# Patient Record
Sex: MALE | Race: BLACK OR AFRICAN AMERICAN | Employment: FULL TIME | URBAN - METROPOLITAN AREA
[De-identification: names, ages, dates, MRNs, and addresses within clinical notes are randomized per-mention and may not be internally consistent; named-entity substitution may affect disease eponyms.]

---

## 2019-06-05 ENCOUNTER — HOSPITAL ENCOUNTER (EMERGENCY)
Age: 34
Discharge: HOME | End: 2019-06-06
Attending: EMERGENCY MEDICINE
Payer: COMMERCIAL

## 2019-06-05 DIAGNOSIS — S86.911A KNEE STRAIN, RIGHT, INITIAL ENCOUNTER: Primary | ICD-10-CM

## 2019-06-05 DIAGNOSIS — R03.0 ELEVATED BLOOD PRESSURE READING: ICD-10-CM

## 2019-06-05 PROCEDURE — 99283 EMERGENCY DEPT VISIT LOW MDM: CPT

## 2019-06-05 NOTE — LETTER
[facility] [place] 
[facility] 
[address] [address] 
[phone] Work/School Note Date: 6/5/2019 To Whom It May concern: 
 
Charla Baugh was seen and treated today in the emergency room by the following provider(s): 
Attending Provider: Thalia Moore DO Physician Assistant: DIPTI Reyna.   
 
Charla Baugh may be excused from work today Sincerely, 
 
 
 
 
DIPTI To

## 2019-06-06 ENCOUNTER — APPOINTMENT (OUTPATIENT)
Dept: GENERAL RADIOLOGY | Age: 34
End: 2019-06-06
Attending: PHYSICIAN ASSISTANT
Payer: COMMERCIAL

## 2019-06-06 VITALS
SYSTOLIC BLOOD PRESSURE: 172 MMHG | BODY MASS INDEX: 37.1 KG/M2 | HEART RATE: 100 BPM | OXYGEN SATURATION: 96 % | TEMPERATURE: 98.5 F | RESPIRATION RATE: 16 BRPM | DIASTOLIC BLOOD PRESSURE: 93 MMHG | HEIGHT: 71 IN | WEIGHT: 265 LBS

## 2019-06-06 PROCEDURE — 73564 X-RAY EXAM KNEE 4 OR MORE: CPT

## 2019-06-06 PROCEDURE — L1830 KO IMMOB CANVAS LONG PRE OTS: HCPCS

## 2019-06-06 RX ORDER — TRAMADOL HYDROCHLORIDE 50 MG/1
50 TABLET ORAL
Qty: 20 TAB | Refills: 0 | Status: SHIPPED | OUTPATIENT
Start: 2019-06-06 | End: 2019-06-09

## 2019-06-06 NOTE — DISCHARGE INSTRUCTIONS
Patient Education        Elevated Blood Pressure: Care Instructions  Your Care Instructions    Blood pressure is a measure of how hard the blood pushes against the walls of your arteries. It's normal for blood pressure to go up and down throughout the day. But if it stays up over time, you have high blood pressure. Two numbers tell you your blood pressure. The first number is the systolic pressure. It shows how hard the blood pushes when your heart is pumping. The second number is the diastolic pressure. It shows how hard the blood pushes between heartbeats, when your heart is relaxed and filling with blood. An ideal blood pressure in adults is less than 120/80 (say \"120 over 80\"). High blood pressure is 140/90 or higher. You have high blood pressure if your top number is 140 or higher or your bottom number is 90 or higher, or both. The main test for high blood pressure is simple, fast, and painless. To diagnose high blood pressure, your doctor will test your blood pressure at different times. After testing your blood pressure, your doctor may ask you to test it again when you are home. If you are diagnosed with high blood pressure, you can work with your doctor to make a long-term plan to manage it. Follow-up care is a key part of your treatment and safety. Be sure to make and go to all appointments, and call your doctor if you are having problems. It's also a good idea to know your test results and keep a list of the medicines you take. How can you care for yourself at home? · Do not smoke. Smoking increases your risk for heart attack and stroke. If you need help quitting, talk to your doctor about stop-smoking programs and medicines. These can increase your chances of quitting for good. · Stay at a healthy weight. · Try to limit how much sodium you eat to less than 2,300 milligrams (mg) a day. Your doctor may ask you to try to eat less than 1,500 mg a day. · Be physically active.  Get at least 30 minutes of exercise on most days of the week. Walking is a good choice. You also may want to do other activities, such as running, swimming, cycling, or playing tennis or team sports. · Avoid or limit alcohol. Talk to your doctor about whether you can drink any alcohol. · Eat plenty of fruits, vegetables, and low-fat dairy products. Eat less saturated and total fats. · Learn how to check your blood pressure at home. When should you call for help? Call your doctor now or seek immediate medical care if:  ? · Your blood pressure is much higher than normal (such as 180/110 or higher). ? · You think high blood pressure is causing symptoms such as:  ¨ Severe headache. ¨ Blurry vision. ? Watch closely for changes in your health, and be sure to contact your doctor if:  ? · You do not get better as expected. Where can you learn more? Go to http://waqas-terrence.info/. Enter Q408 in the search box to learn more about \"Elevated Blood Pressure: Care Instructions. \"  Current as of: September 21, 2016  Content Version: 11.4  © 5214-1795 [employer]. Care instructions adapted under license by [employer] (which disclaims liability or warranty for this information). If you have questions about a medical condition or this instruction, always ask your healthcare professional. [facility] any warranty or liability for your use of this information.

## 2019-06-06 NOTE — ED PROVIDER NOTES
EMERGENCY DEPARTMENT HISTORY AND PHYSICAL EXAM    Date: 6/5/2019  Patient Name: King Palma    History of Presenting Illness     Chief Complaint   Patient presents with    Knee Pain         History Provided By: Patient    King Palma is a 37 y.o. male with PMHX of hypertension who presents to the emergency department C/O right knee pain. Associated sxs include right knee swelling, painful ambulation. Patient reports 2 to 3-week history of right lateral knee pain. Patient states he is due to the area at the time of the knee pain first started he had just moved states there was no particular fall or injury however did a lot of moving furniture boxes may have caused the pain patient really seemed unsure. Patient is attempted Aleve and Motrinand 1 of his wife's muscle relaxers with no relief. Pain seems to be worsened with bearing weight and improves with rest.  Patient endorses a history of high blood pressure however does not take any medications. Patient states he is new to the area would like a referral for primary care doctor. Pt denies fall, particular injury, history of knee problems, redness warmth rash, swelling to the leg, and any other sxs or complaints. PCP: No primary care provider on file. Past History     Past Medical History:  History reviewed. No pertinent past medical history. Past Surgical History:  History reviewed. No pertinent surgical history. Family History:  History reviewed. No pertinent family history. Social History:  Social History     Tobacco Use    Smoking status: Never Smoker    Smokeless tobacco: Never Used   Substance Use Topics    Alcohol use: Never     Frequency: Never    Drug use: Never       Allergies:  No Known Allergies      Review of Systems   Review of Systems   Constitutional: Negative for fever. Musculoskeletal: Positive for arthralgias. Negative for myalgias. Skin: Negative for rash and wound.    Neurological: Negative for weakness and numbness. All other systems reviewed and are negative. Physical Exam     Vitals:    06/06/19 0001   BP: (!) 163/107   Pulse: (!) 106   Resp: 14   Temp: 98.5 °F (36.9 °C)   SpO2: 98%   Weight: 120.2 kg (265 lb)   Height: 5' 11\" (1.803 m)     Physical Exam   Constitutional: He is oriented to person, place, and time. He appears well-developed and well-nourished. No distress. Overweight reclined on stretcher appears comfortable in no acute distress   HENT:   Head: Normocephalic and atraumatic. Eyes: Pupils are equal, round, and reactive to light. Conjunctivae and EOM are normal.   Neck: Normal range of motion. Neck supple. Musculoskeletal: Normal range of motion. He exhibits tenderness. He exhibits no edema or deformity. Right hip: Normal.        Right knee: He exhibits normal range of motion, no swelling, no effusion, no ecchymosis, no deformity, no erythema and normal alignment. Tenderness found. Lateral joint line tenderness noted. Right ankle: Normal. He exhibits normal pulse. Legs:  RLE: Atraumatic appearing without erythema or warmth,+tenderness to right lateral knee, full range of motion, no pedal edema, no calf tenderness, neurovascular intact   Neurological: He is alert and oriented to person, place, and time. Skin: Skin is warm and dry. Psychiatric: He has a normal mood and affect. His behavior is normal.   Nursing note and vitals reviewed. Diagnostic Study Results     Labs -   No results found for this or any previous visit (from the past 12 hour(s)). Radiologic Studies -   XR KNEE RT MIN 4 V    (Results Pending)     CT Results  (Last 48 hours)    None        CXR Results  (Last 48 hours)    None          Medications given in the ED-  Medications - No data to display      Medical Decision Making   I am the first provider for this patient.     I reviewed the vital signs, available nursing notes, past medical history, past surgical history, family history and social history. Vital Signs-Reviewed the patient's vital signs. Pulse Oximetry Analysis - 100% on RA     Records Reviewed: Nursing Notes    Procedures:  Procedures    ED Course:   12:00 AM   Initial assessment performed. The patients presenting problems have been discussed, and they are in agreement with the care plan formulated and outlined with them. I have encouraged them to ask questions as they arise throughout their visit. Discussion: 37 y.o. male planing of to 3-week history of right lateral knee pain. Reports new to the area with a lot of moving of furniture at the onset of pain may have injured at that time. He is neurovascularly intact x-rays with no acute process likely mechanical strain to knee will plan for rice instructions immobilizer crutches pain medication and orthopedic follow-up. Side note patient also found to have elevated blood pressure reading offered referral for primary care doctor and blood pressure checks. Strict return precautions discussed. Diagnosis and Disposition       DISCHARGE NOTE:  Chayito Jerrell Halle's  results have been reviewed with him. He has been counseled regarding his diagnosis, treatment, and plan. He verbally conveys understanding and agreement of the signs, symptoms, diagnosis, treatment and prognosis and additionally agrees to follow up as discussed. He also agrees with the care-plan and conveys that all of his questions have been answered. I have also provided discharge instructions for him that include: educational information regarding their diagnosis and treatment, and list of reasons why they would want to return to the ED prior to their follow-up appointment, should his condition change. He has been provided with education for proper emergency department utilization. CLINICAL IMPRESSION:    1. Knee strain, right, initial encounter    2. Elevated blood pressure reading        PLAN:  1. D/C Home  2.    Current Discharge Medication List START taking these medications    Details   traMADol (ULTRAM) 50 mg tablet Take 1 Tab by mouth every six (6) hours as needed for Pain for up to 3 days. Max Daily Amount: 200 mg. Qty: 20 Tab, Refills: 0    Associated Diagnoses: Knee strain, right, initial encounter           3. Follow-up Information     Follow up With Specialties Details Why Contact Info    your pcp  Schedule an appointment as soon as possible for a visit      [facility] EMERGENCY DEPT Emergency Medicine  As needed, If symptoms worsen [address] [address]  [phone]    Gautam Palmer MD Orthopedic Surgery Schedule an appointment as soon as possible for a visit  [address] [place] [address]  [address]      Lesvia Lancaster MD Internal Medicine  for primary care follow up Carolyn Martinez  [phone]                  Please note that this dictation was completed with [employer], the computer voice recognition software. Quite often unanticipated grammatical, syntax, homophones, and other interpretive errors are inadvertently transcribed by the computer software. Please disregard these errors. Please excuse any errors that have escaped final proofreading.

## 2023-12-28 NOTE — ED TRIAGE NOTES
Bariatric Post-Op Follow Up Appointment: 3 mos     Current Weight: 213 lb  Current BMI: 32.63  Percentage of weight loss achieved: 55    Dietary Intake: lifelong rules   Breakfast- a greek yogurt topped with 1/3 - 1/2 cup protein granola   Lunch- 2 turkey meat sticks with cheese cubes and a couple of slices of cucumber dipped in humus   Dinner- meatloaf with side of a little bit of mashed potatoes and green beans   Volume of food at a time: 3/4 cup   Snacks: no sugar added popsicle   Beverages: water   Alcohol Intake: 1 drink since surgery   Do you drink with your meals? No   Current Exercise: walking everyday for at least 30 minutes, or an exercise video on occ when pt can't walk  Vitamins/minerals: Bariatric Pal 45 mg MVI, calcium cirtate 2x/day.   Food intolerances? No   Any decrease in energy levels? No   Any questions or concerns? Pt reports a past of throwing up everyday due to possibly eating too fast. Pt reports this has since resolved - the last time being x 1 month ago. We reviewed importance of taking small bites and chewing foods well.       Angie Gutiérrez, RD, LDN  Registered Dietitian, Licensed Dietitian Nutritionist              Pt arrives ambulatory to ED with right knee pain x 1 week